# Patient Record
Sex: FEMALE | Race: WHITE | URBAN - METROPOLITAN AREA
[De-identification: names, ages, dates, MRNs, and addresses within clinical notes are randomized per-mention and may not be internally consistent; named-entity substitution may affect disease eponyms.]

---

## 2017-02-09 NOTE — PROGRESS NOTES
"  SUBJECTIVE:                                                    Cristina Garcia is a 61 year old female who presents to clinic today for the following health issues:    Depression and Anxiety Follow-Up    Status since last visit: Improved with medication, doing very well     Other associated symptoms:None    Complicating factors:     Significant life event: No     Current substance abuse: None    PHQ-9 SCORE 1/4/2016 8/17/2016 9/1/2016   Total Score - - -   Total Score 0 2 0     JACKSON-7 SCORE 1/4/2016 8/17/2016 9/1/2016   Total Score - - -   Total Score 0 4 0        PHQ-9  English      PHQ-9   Any Language     GAD7         Amount of exercise or physical activity: 6-7 days/week for an average of 45-60 minutes    Problems taking medications regularly: No    Medication side effects: none    Diet: regular (no restrictions)        Problem list and histories reviewed & adjusted, as indicated.  Additional history: as documented    BP Readings from Last 3 Encounters:   02/21/17 102/54   09/01/16 111/65   08/17/16 100/66    Wt Readings from Last 3 Encounters:   02/21/17 145 lb 4.8 oz (65.9 kg)   09/01/16 137 lb (62.1 kg)   08/17/16 138 lb (62.6 kg)                  Labs reviewed in EPIC    ROS:  C: NEGATIVE for fever, chills, change in weight  INTEGUMENTARY/SKIN: has an itchy mole on her right breast, not as itchy now but on occasion will still itch  E/M: NEGATIVE for ear, mouth and throat problems  R: NEGATIVE for significant cough or SOB  CV: NEGATIVE for chest pain, palpitations or peripheral edema  GI: NEGATIVE for nausea, abdominal pain, heartburn, or change in bowel habits  PSYCHIATRIC: See PHQ 9 and JACKSON 7 questionnaires for symptoms.     OBJECTIVE:                                                    /54 (BP Location: Right arm, Patient Position: Chair, Cuff Size: Adult Regular)  Pulse 64  Temp 97.3  F (36.3  C) (Temporal)  Resp 16  Ht 5' 5\" (1.651 m)  Wt 145 lb 4.8 oz (65.9 kg)  Breastfeeding? No  BMI 24.18 " kg/m2  Body mass index is 24.18 kg/(m^2).  GENERAL: healthy, alert and no distress  SKIN: keratoses - seborrheic # 1, typical in appearance on left breast   PSYCH: mentation appears normal, affect normal/bright    Diagnostic Test Results:  none      ASSESSMENT/PLAN:                                                          1. Anxiety  Doing well, continue current meds  - sertraline (ZOLOFT) 25 MG tablet; Take 1 tablet (25 mg) by mouth daily  Dispense: 90 tablet; Refill: 1    2. Major depression in complete remission (H)  Doing well continue current meds  - sertraline (ZOLOFT) 25 MG tablet; Take 1 tablet (25 mg) by mouth daily  Dispense: 90 tablet; Refill: 1    3. Hyperlipidemia with target LDL less than 130  Await labs  - **Lipid panel reflex to direct LDL FUTURE anytime; Future    4. Screening for diabetes mellitus  Await labs  - Glucose; Future    5. Inflamed seborrheic keratosis  seb kwere all treated in 3 freeze thaw cycles   - DESTRUCT BENIGN LESION, UP TO 14    Recheck in 6 months     Marlin Ann PA-C  [facility]  Electronically signed by Marlin Ann PA-C

## 2017-02-21 ENCOUNTER — OFFICE VISIT (OUTPATIENT)
Dept: FAMILY MEDICINE | Facility: OTHER | Age: 62
End: 2017-02-21
Payer: COMMERCIAL

## 2017-02-21 VITALS
BODY MASS INDEX: 24.21 KG/M2 | DIASTOLIC BLOOD PRESSURE: 54 MMHG | HEIGHT: 65 IN | SYSTOLIC BLOOD PRESSURE: 102 MMHG | RESPIRATION RATE: 16 BRPM | TEMPERATURE: 97.3 F | WEIGHT: 145.3 LBS | HEART RATE: 64 BPM

## 2017-02-21 DIAGNOSIS — Z13.1 SCREENING FOR DIABETES MELLITUS: ICD-10-CM

## 2017-02-21 DIAGNOSIS — E78.5 HYPERLIPIDEMIA WITH TARGET LDL LESS THAN 130: Primary | ICD-10-CM

## 2017-02-21 DIAGNOSIS — F32.5 MAJOR DEPRESSION IN COMPLETE REMISSION (H): ICD-10-CM

## 2017-02-21 DIAGNOSIS — L82.0 INFLAMED SEBORRHEIC KERATOSIS: ICD-10-CM

## 2017-02-21 DIAGNOSIS — F41.9 ANXIETY: ICD-10-CM

## 2017-02-21 PROCEDURE — 99213 OFFICE O/P EST LOW 20 MIN: CPT | Mod: 25 | Performed by: PHYSICIAN ASSISTANT

## 2017-02-21 PROCEDURE — 17110 DESTRUCTION B9 LES UP TO 14: CPT | Performed by: PHYSICIAN ASSISTANT

## 2017-02-21 RX ORDER — SERTRALINE HYDROCHLORIDE 25 MG/1
25 TABLET, FILM COATED ORAL DAILY
Qty: 90 TABLET | Refills: 1 | Status: SHIPPED | OUTPATIENT
Start: 2017-02-21 | End: 2017-08-21

## 2017-02-21 ASSESSMENT — ANXIETY QUESTIONNAIRES
GAD7 TOTAL SCORE: 0
2. NOT BEING ABLE TO STOP OR CONTROL WORRYING: NOT AT ALL
6. BECOMING EASILY ANNOYED OR IRRITABLE: NOT AT ALL
7. FEELING AFRAID AS IF SOMETHING AWFUL MIGHT HAPPEN: NOT AT ALL
1. FEELING NERVOUS, ANXIOUS, OR ON EDGE: NOT AT ALL
3. WORRYING TOO MUCH ABOUT DIFFERENT THINGS: NOT AT ALL
IF YOU CHECKED OFF ANY PROBLEMS ON THIS QUESTIONNAIRE, HOW DIFFICULT HAVE THESE PROBLEMS MADE IT FOR YOU TO DO YOUR WORK, TAKE CARE OF THINGS AT HOME, OR GET ALONG WITH OTHER PEOPLE: NOT DIFFICULT AT ALL
5. BEING SO RESTLESS THAT IT IS HARD TO SIT STILL: NOT AT ALL

## 2017-02-21 ASSESSMENT — PAIN SCALES - GENERAL: PAINLEVEL: NO PAIN (0)

## 2017-02-21 ASSESSMENT — PATIENT HEALTH QUESTIONNAIRE - PHQ9: 5. POOR APPETITE OR OVEREATING: NOT AT ALL

## 2017-02-21 NOTE — MR AVS SNAPSHOT
After Visit Summary   2/21/2017    Cristina Garcia    MRN: [id number]           Patient Information     Date Of Birth          1955        Visit Information        Provider Department      2/21/2017 8:30 AM Marlin Ann PA-C [facility]        Today's Diagnoses     Hyperlipidemia with target LDL less than 130    -  1    Anxiety        Major depression in complete remission (H)        Screening for diabetes mellitus        Inflamed seborrheic keratosis           Follow-ups after your visit        Your next 10 appointments already scheduled     Feb 22, 2017  9:00 AM CST   LAB with NL LAB [facility] ([facility])    [address] [address] [phone]           Patient must bring picture ID.  Patient should be prepared to give a urine specimen  Please do not eat 10-12 hours before your appointment if you are coming in fasting for labs on lipids, cholesterol, or glucose (sugar).  Pregnant women should follow their Care Team instructions. Water with medications is okay. Do not drink coffee or other fluids.   If you have concerns about taking  your medications, please ask at office or if scheduling via [employer], send a message by clicking on Secure Messaging, Message Your Care Team.            Feb 27, 2017 10:30 AM CST   MA SCREENING DIGITAL BILATERAL with PHMA1   [facility] ([facility])    [address] [place] [address] [phone]           Do not use any powder, lotion or deodorant under your arms or on your breast. If you do, we will ask you to remove it before your exam.  Wear comfortable, two-piece clothing.  If you have any allergies, tell your care team.  Bring any previous mammograms from other facilities or have them mailed to the breast center.              Future tests that were ordered for you today     Open Future Orders        Priority Expected Expires Ordered     "**Lipid panel reflex to direct LDL FUTURE anytime Routine 2017    Glucose Routine  2018            Who to contact     If you have questions or need follow up information about today's clinic visit or your schedule please contact [facility] directly at [phone].  Normal or non-critical lab and imaging results will be communicated to you by MyChart, letter or phone within 4 business days after the clinic has received the results. If you do not hear from us within 7 days, please contact the clinic through [employer] or phone. If you have a critical or abnormal lab result, we will notify you by phone as soon as possible.  Submit refill requests through ip.access or call your pharmacy and they will forward the refill request to us. Please allow 3 business days for your refill to be completed.          Additional Information About Your Visit        ip.access Information     [email or web address] lets you send messages to your doctor, view your test results, renew your prescriptions, schedule appointments and more. To sign up, go to [email or web address] . Click on \"Log in\" on the left side of the screen, which will take you to the Welcome page. Then click on \"Sign up Now\" on the right side of the page.     You will be asked to enter the access code listed below, as well as some personal information. Please follow the directions to create your username and password.     Your access code is: [email or web address]  Expires: 2017  8:58 AM     Your access code will  in 90 days. If you need help or a new code, please call your [place] clinic or [phone].        Care EveryWhere ID     This is your Care EveryWhere ID. This could be used by other organizations to access your [place] medical records  [id number]        Your Vitals Were     Pulse Temperature Respirations Height Breastfeeding? BMI (Body Mass Index)    64 97.3  F (36.3  C) (Temporal) 16 5' 5\" (1.651 m) No 24.18 kg/m2 "       Blood Pressure from Last 3 Encounters:   02/21/17 102/54   09/01/16 111/65   08/17/16 100/66    Weight from Last 3 Encounters:   02/21/17 145 lb 4.8 oz (65.9 kg)   09/01/16 137 lb (62.1 kg)   08/17/16 138 lb (62.6 kg)              We Performed the Following     DESTRUCT BENIGN LESION, UP TO 14          Where to get your medicines      These medications were sent to [facility] [place] - [address]   [address] [place] [address]     Phone:  [phone]     sertraline 25 MG tablet          Primary Care Provider Office Phone # Fax #    Marlin Ann PA-C [phone]       [facility] [address] [place] [address]        Thank you!     Thank you for choosing [facility]  for your care. Our goal is always to provide you with excellent care. Hearing back from our patients is one way we can continue to improve our services. Please take a few minutes to complete the written survey that you may receive in the mail after your visit with us. Thank you!             Your Updated Medication List - Protect others around you: Learn how to safely use, store and throw away your medicines at www.disposemymeds.org.          This list is accurate as of: 2/21/17  8:58 AM.  Always use your most recent med list.                   Brand Name Dispense Instructions for use    ASPIRIN ADULT LOW STRENGTH PO      Take 81 mg by mouth       calcium carbonate 500 MG tablet    OS-DONNA 500 mg [place]. Ca     Take 1,000 mg by mouth daily       MULTIVITAMIN PO          sertraline 25 MG tablet    ZOLOFT    90 tablet    Take 1 tablet (25 mg) by mouth daily       ZINC SULFATE PO      Take 100 mg by mouth daily

## 2017-02-21 NOTE — NURSING NOTE
"Chief Complaint   Patient presents with     Depression     Panel Management     lipid, PHQ       Initial /54 (BP Location: Right arm, Patient Position: Chair, Cuff Size: Adult Regular)  Pulse 64  Temp 97.3  F (36.3  C) (Temporal)  Resp 16  Ht 5' 5\" (1.651 m)  Wt 145 lb 4.8 oz (65.9 kg)  Breastfeeding? No  BMI 24.18 kg/m2 Estimated body mass index is 24.18 kg/(m^2) as calculated from the following:    Height as of this encounter: 5' 5\" (1.651 m).    Weight as of this encounter: 145 lb 4.8 oz (65.9 kg).  Medication Reconciliation: complete    "

## 2017-02-22 DIAGNOSIS — Z13.1 SCREENING FOR DIABETES MELLITUS: ICD-10-CM

## 2017-02-22 DIAGNOSIS — E78.5 HYPERLIPIDEMIA WITH TARGET LDL LESS THAN 130: ICD-10-CM

## 2017-02-22 LAB
CHOLEST SERPL-MCNC: 261 MG/DL
GLUCOSE SERPL-MCNC: 97 MG/DL (ref 70–99)
HDLC SERPL-MCNC: 103 MG/DL
LDLC SERPL CALC-MCNC: 148 MG/DL
NONHDLC SERPL-MCNC: 158 MG/DL
TRIGL SERPL-MCNC: 48 MG/DL

## 2017-02-22 PROCEDURE — 80061 LIPID PANEL: CPT | Performed by: PHYSICIAN ASSISTANT

## 2017-02-22 PROCEDURE — 82947 ASSAY GLUCOSE BLOOD QUANT: CPT | Performed by: PHYSICIAN ASSISTANT

## 2017-02-22 PROCEDURE — 36415 COLL VENOUS BLD VENIPUNCTURE: CPT | Performed by: PHYSICIAN ASSISTANT

## 2017-02-22 ASSESSMENT — ANXIETY QUESTIONNAIRES: GAD7 TOTAL SCORE: 0

## 2017-02-22 ASSESSMENT — PATIENT HEALTH QUESTIONNAIRE - PHQ9: SUM OF ALL RESPONSES TO PHQ QUESTIONS 1-9: 0

## 2017-02-27 ENCOUNTER — HOSPITAL ENCOUNTER (OUTPATIENT)
Dept: MAMMOGRAPHY | Facility: CLINIC | Age: 62
Discharge: HOME | End: 2017-02-27
Attending: PHYSICIAN ASSISTANT | Admitting: PHYSICIAN ASSISTANT
Payer: COMMERCIAL

## 2017-02-27 DIAGNOSIS — Z12.31 VISIT FOR SCREENING MAMMOGRAM: ICD-10-CM

## 2017-02-27 PROCEDURE — G0202 SCR MAMMO BI INCL CAD: HCPCS

## 2017-03-06 NOTE — PROGRESS NOTES
"  SUBJECTIVE:                                                    Cristina Garcia is a 61 year old female who presents to clinic today for the following health issues:      She is going out of town in a few days for 1 week. Worries about being sick while gone     Sinus Symptoms      Duration: about 2 weeks    Description  nasal congestion, sore throat, cough, chills, fatigue/malaise and ears \"gurgle\"  Left sided nose/sinus pressure, maybe some PND at night, her throat gets very sore at night.  Cough is mild.    Severity: moderate    Accompanying signs and symptoms: None    History (predisposing factors):  none    Precipitating or alleviating factors: None    Therapies tried and outcome:  Nettipot, hot broth, advil, aleve, benadryl            Problem list and histories reviewed & adjusted, as indicated.  Additional history: as documented    BP Readings from Last 3 Encounters:   03/07/17 112/62   02/21/17 102/54   09/01/16 111/65    Wt Readings from Last 3 Encounters:   03/07/17 146 lb (66.2 kg)   02/21/17 145 lb 4.8 oz (65.9 kg)   09/01/16 137 lb (62.1 kg)                  Labs reviewed in EPIC    Reviewed and updated as needed this visit by clinical staff       Reviewed and updated as needed this visit by Provider         ROS:  As documented above     OBJECTIVE:                                                    /62  Pulse 72  Temp 97.9  F (36.6  C) (Oral)  Resp 12  Ht 5' 5\" (1.651 m)  Wt 146 lb (66.2 kg)  SpO2 97%  BMI 24.3 kg/m2  Body mass index is 24.3 kg/(m^2).  GENERAL: healthy, alert and no distress  EYES: Eyes grossly normal to inspection  HENT: normal cephalic/atraumatic, ear canals and TM's normal, nose and mouth without ulcers or lesions, nasal mucosa edematous , oropharynx clear, oral mucous membranes moist and sinuses: not tender  NECK: no adenopathy, no asymmetry, masses, or scars and thyroid normal to palpation  RESP: lungs clear to auscultation - no rales, rhonchi or wheezes  CV: regular rate and " rhythm, normal S1 S2, no S3 or S4, no murmur, click or rub, no peripheral edema and peripheral pulses strong  MS: no gross musculoskeletal defects noted, no edema    Diagnostic Test Results:  none      ASSESSMENT/PLAN:                                                            1. Acute non-recurrent maxillary sinusitis  otc meds, netti pot, fluids, rest, discussed use of amox or Augmentin pt prefers z pack, it worked best before   - azithromycin (ZITHROMAX) 250 MG tablet; Two tablets first day, then one tablet daily for four days.  Dispense: 6 tablet; Refill: 0        Marlin Ann PA-C  [facility]  Electronically signed by Marlin Ann PA-C

## 2017-03-07 ENCOUNTER — OFFICE VISIT (OUTPATIENT)
Dept: FAMILY MEDICINE | Facility: OTHER | Age: 62
End: 2017-03-07
Payer: COMMERCIAL

## 2017-03-07 VITALS
SYSTOLIC BLOOD PRESSURE: 112 MMHG | BODY MASS INDEX: 24.32 KG/M2 | HEIGHT: 65 IN | RESPIRATION RATE: 12 BRPM | TEMPERATURE: 97.9 F | HEART RATE: 72 BPM | OXYGEN SATURATION: 97 % | WEIGHT: 146 LBS | DIASTOLIC BLOOD PRESSURE: 62 MMHG

## 2017-03-07 DIAGNOSIS — J01.00 ACUTE NON-RECURRENT MAXILLARY SINUSITIS: Primary | ICD-10-CM

## 2017-03-07 PROCEDURE — 99213 OFFICE O/P EST LOW 20 MIN: CPT | Performed by: PHYSICIAN ASSISTANT

## 2017-03-07 RX ORDER — AZITHROMYCIN 250 MG/1
TABLET, FILM COATED ORAL
Qty: 6 TABLET | Refills: 0 | Status: SHIPPED | OUTPATIENT
Start: 2017-03-07 | End: 2017-09-20

## 2017-03-07 ASSESSMENT — PAIN SCALES - GENERAL: PAINLEVEL: NO PAIN (0)

## 2017-03-07 NOTE — NURSING NOTE
"Chief Complaint   Patient presents with     Sinus Problem     Panel Management     mychart        Initial /62  Pulse 72  Temp 97.9  F (36.6  C) (Oral)  Resp 12  Ht 5' 5\" (1.651 m)  Wt 146 lb (66.2 kg)  SpO2 97%  BMI 24.3 kg/m2 Estimated body mass index is 24.3 kg/(m^2) as calculated from the following:    Height as of this encounter: 5' 5\" (1.651 m).    Weight as of this encounter: 146 lb (66.2 kg).  Medication Reconciliation: complete     Lila King CMA (AAMA)      "

## 2017-03-07 NOTE — MR AVS SNAPSHOT
"              After Visit Summary   3/7/2017    Cristina Garcia    MRN: [id number]           Patient Information     Date Of Birth          1955        Visit Information        Provider Department      3/7/2017 9:15 AM Marlin Ann PA-C [facility]        Today's Diagnoses     Acute non-recurrent maxillary sinusitis    -  1       Follow-ups after your visit        Who to contact     If you have questions or need follow up information about today's clinic visit or your schedule please contact [facility] directly at [phone].  Normal or non-critical lab and imaging results will be communicated to you by [employer], letter or phone within 4 business days after the clinic has received the results. If you do not hear from us within 7 days, please contact the clinic through [employer] or phone. If you have a critical or abnormal lab result, we will notify you by phone as soon as possible.  Submit refill requests through [employer] or call your pharmacy and they will forward the refill request to us. Please allow 3 business days for your refill to be completed.          Additional Information About Your Visit        MyChart Information     [employer] lets you send messages to your doctor, view your test results, renew your prescriptions, schedule appointments and more. To sign up, go to [email or web address] . Click on \"Log in\" on the left side of the screen, which will take you to the Welcome page. Then click on \"Sign up Now\" on the right side of the page.     You will be asked to enter the access code listed below, as well as some personal information. Please follow the directions to create your username and password.     Your access code is: [email or web address]  Expires: 2017  8:58 AM     Your access code will  in 90 days. If you need help or a new code, please call your [facility] or [phone].        Care EveryWhere ID     This is your Care EveryWhere ID. This could be used " "by other organizations to access your [place] medical records  [id number]        Your Vitals Were     Pulse Temperature Respirations Height Pulse Oximetry BMI (Body Mass Index)    72 97.9  F (36.6  C) (Oral) 12 5' 5\" (1.651 m) 97% 24.3 kg/m2       Blood Pressure from Last 3 Encounters:   03/07/17 112/62   02/21/17 102/54   09/01/16 111/65    Weight from Last 3 Encounters:   03/07/17 146 lb (66.2 kg)   02/21/17 145 lb 4.8 oz (65.9 kg)   09/01/16 137 lb (62.1 kg)              Today, you had the following     No orders found for display         Today's Medication Changes          These changes are accurate as of: 3/7/17  9:22 AM.  If you have any questions, ask your nurse or doctor.               Start taking these medicines.        Dose/Directions    azithromycin 250 MG tablet   Commonly known as:  ZITHROMAX   Used for:  Acute non-recurrent maxillary sinusitis   Started by:  Marlin Ann PA-C        Two tablets first day, then one tablet daily for four days.   Quantity:  6 tablet   Refills:  0            Where to get your medicines      These medications were sent to [facility] - [place] - [address]   [address] [place] [address]     Phone:  [phone]     azithromycin 250 MG tablet                Primary Care Provider Office Phone # Fax #    Marlin Ann PA-C [phone]       [facility] [address] [place] [address]        Thank you!     Thank you for choosing [facility]  for your care. Our goal is always to provide you with excellent care. Hearing back from our patients is one way we can continue to improve our services. Please take a few minutes to complete the written survey that you may receive in the mail after your visit with us. Thank you!             Your Updated Medication List - Protect others around you: Learn how to safely use, store and throw away your medicines at www.disposemymeds.org.        "   This list is accurate as of: 3/7/17  9:22 AM.  Always use your most recent med list.                   Brand Name Dispense Instructions for use    ASPIRIN ADULT LOW STRENGTH PO      Take 81 mg by mouth       azithromycin 250 MG tablet    ZITHROMAX    6 tablet    Two tablets first day, then one tablet daily for four days.       calcium carbonate 500 MG tablet    OS-DONNA 500 mg [place]. Ca     Take 1,000 mg by mouth daily       MULTIVITAMIN PO          sertraline 25 MG tablet    ZOLOFT    90 tablet    Take 1 tablet (25 mg) by mouth daily       ZINC SULFATE PO      Take 100 mg by mouth daily

## 2017-08-21 DIAGNOSIS — F41.9 ANXIETY: ICD-10-CM

## 2017-08-21 DIAGNOSIS — F32.5 MAJOR DEPRESSION IN COMPLETE REMISSION (H): ICD-10-CM

## 2017-08-22 RX ORDER — SERTRALINE HYDROCHLORIDE 25 MG/1
TABLET, FILM COATED ORAL
Qty: 30 TABLET | Refills: 0 | Status: SHIPPED | OUTPATIENT
Start: 2017-08-22 | End: 2017-09-20

## 2017-08-22 NOTE — TELEPHONE ENCOUNTER
Medication is being filled for 1 time refill only due to:  Patient needs to be seen because due for a mood follow up.  Last mood follow up was on 2/21/2017.    Brittany López RN

## 2017-08-22 NOTE — TELEPHONE ENCOUNTER
sertraline (ZOLOFT) 25 MG tablet     Last Written Prescription Date: 2/21/17  Last Fill Quantity: 90, # refills: 1  Last Office Visit with G primary care provider:  3/7/17        Last PHQ-9 score on record=   PHQ-9 SCORE 2/21/2017   Total Score -   Total Score 0

## 2017-08-31 ENCOUNTER — MYC MEDICAL ADVICE (OUTPATIENT)
Dept: FAMILY MEDICINE | Facility: OTHER | Age: 62
End: 2017-08-31

## 2017-08-31 DIAGNOSIS — E78.00 HYPERCHOLESTEREMIA: Primary | ICD-10-CM

## 2017-09-01 DIAGNOSIS — E78.00 HYPERCHOLESTEREMIA: ICD-10-CM

## 2017-09-01 LAB
CHOLEST SERPL-MCNC: 260 MG/DL
HDLC SERPL-MCNC: 104 MG/DL
LDLC SERPL CALC-MCNC: 137 MG/DL
NONHDLC SERPL-MCNC: 156 MG/DL
TRIGL SERPL-MCNC: 96 MG/DL

## 2017-09-01 PROCEDURE — 80061 LIPID PANEL: CPT | Performed by: PHYSICIAN ASSISTANT

## 2017-09-01 PROCEDURE — 36415 COLL VENOUS BLD VENIPUNCTURE: CPT | Performed by: PHYSICIAN ASSISTANT

## 2017-09-11 ENCOUNTER — TELEPHONE (OUTPATIENT)
Dept: FAMILY MEDICINE | Facility: OTHER | Age: 62
End: 2017-09-11

## 2017-09-11 NOTE — TELEPHONE ENCOUNTER
Summary:    Patient is due/failing the following:   FIT test   Action needed:   Complete a FIT test     Type of outreach:    Phone, spoke to patient.  patient will discuss at her OV.    Questions for provider review:    None                                                                                                                                    Jovita Macias     Chart routed to Care Team .        Panel Management Review      Patient has the following on her problem list:     Depression / Dysthymia review  PHQ-9 SCORE 8/17/2016 9/1/2016 2/21/2017   Total Score - - -   Total Score 2 0 0      Patient is due for:  PHQ9 and DAP        Composite cancer screening  Chart review shows that this patient is due/due soon for the following Fecal Colorectal (FIT)

## 2017-09-14 NOTE — PROGRESS NOTES
"  SUBJECTIVE:                                                    Cristina Garcia is a 62 year old female who presents to clinic today for the following health issues:      History of Present Illness   Depression & Anxiety Follow-up:     Depression/Anxiety:  Anxiety only    Status since last visit::  Stable (she continues to do very well. No side effects or issues with this med)    Other associated symptoms of anxiety::  None    Significant life event::  No    Current substance use::  None    Depression symptoms::  None  Diet::  Regular (no restrictions)  Frequency of exercise::  6-7 days/week  Duration of exercise::  15-30 minutes  Taking medications regularly::  Yes  Medication side effects::  None  Additional concerns today::  YES (allergy/cold)      She has had 10 days of nonproductive cough, headaches with cough and fatigue. No fevers or chills. No facial pain or pressure shortness of breath or sore throat. She does have nasal congestion. She sleeps okay at night due to NyQuil.  Er grandson has been ill and spreads into the rest of them    Problem list and histories reviewed & adjusted, as indicated.  Additional history: as documented        BP Readings from Last 3 Encounters:   09/20/17 110/70   03/07/17 112/62   02/21/17 102/54    Wt Readings from Last 3 Encounters:   09/20/17 141 lb 14.4 oz (64.4 kg)   03/07/17 146 lb (66.2 kg)   02/21/17 145 lb 4.8 oz (65.9 kg)                  Labs reviewed in [facility]      ROS:  As above    OBJECTIVE:     /70 (BP Location: Left arm, Patient Position: Sitting, Cuff Size: Adult Regular)  Pulse 88  Temp 98.7  F (37.1  C) (Oral)  Resp 16  Ht 5' 4.57\" (1.64 m)  Wt 141 lb 14.4 oz (64.4 kg)  Breastfeeding? No  BMI 23.93 kg/m2  Body mass index is 23.93 kg/(m^2).  GENERAL: healthy, alert and no distress  HENT: ear canals and TM's normal, nose and mouth without ulcers or lesions  NECK: no adenopathy, no asymmetry, masses, or scars and thyroid normal to palpation  RESP: lungs " clear to auscultation - no rales, rhonchi or wheezes  CV: regular rate and rhythm, normal S1 S2, no S3 or S4, no murmur, click or rub, no peripheral edema and peripheral pulses strong  ABDOMEN: soft, nontender, no hepatosplenomegaly, no masses and bowel sounds normal  MS: no gross musculoskeletal defects noted, no edema  PSYCH: mentation appears normal, affect normal/bright    Diagnostic Test Results:  none     ASSESSMENT/PLAN:         1. Anxiety  Doing well, continue current meds  - sertraline (ZOLOFT) 25 MG tablet; Take 1 tablet (25 mg) by mouth daily  Dispense: 90 tablet; Refill: 3    2. Major depression in complete remission (H)  Doing well continue current meds recheck 6 months phone visit 1 year office visit  - sertraline (ZOLOFT) 25 MG tablet; Take 1 tablet (25 mg) by mouth daily  Dispense: 90 tablet; Refill: 3    3. Special screening for malignant neoplasms, colon  Card given  - Fecal colorectal cancer screen FIT; Future    4. Viral URI with cough  For now she will treat over-the-counter, she will use Mucinex, she will start Zithromax if symptoms worsening over the next 3-5 days.  - azithromycin (ZITHROMAX) 250 MG tablet; Two tablets first day, then one tablet daily for four days.  Dispense: 6 tablet; Refill: 0    This chart documentation was completed in part with Dragon voice recognition software.  Documentation is reviewed after completion, however, some words and grammatical errors may remain.  POP Elmore PA-C  [facility]  Electronically signed by Marlin Ann PA-C

## 2017-09-20 ENCOUNTER — OFFICE VISIT (OUTPATIENT)
Dept: FAMILY MEDICINE | Facility: OTHER | Age: 62
End: 2017-09-20
Payer: COMMERCIAL

## 2017-09-20 VITALS
RESPIRATION RATE: 16 BRPM | DIASTOLIC BLOOD PRESSURE: 70 MMHG | HEIGHT: 65 IN | SYSTOLIC BLOOD PRESSURE: 110 MMHG | HEART RATE: 88 BPM | TEMPERATURE: 98.7 F | WEIGHT: 141.9 LBS | BODY MASS INDEX: 23.64 KG/M2

## 2017-09-20 DIAGNOSIS — F32.5 MAJOR DEPRESSION IN COMPLETE REMISSION (H): ICD-10-CM

## 2017-09-20 DIAGNOSIS — Z12.11 SPECIAL SCREENING FOR MALIGNANT NEOPLASMS, COLON: Primary | ICD-10-CM

## 2017-09-20 DIAGNOSIS — J06.9 VIRAL URI WITH COUGH: ICD-10-CM

## 2017-09-20 DIAGNOSIS — F41.9 ANXIETY: ICD-10-CM

## 2017-09-20 PROCEDURE — 99214 OFFICE O/P EST MOD 30 MIN: CPT | Performed by: PHYSICIAN ASSISTANT

## 2017-09-20 RX ORDER — SERTRALINE HYDROCHLORIDE 25 MG/1
25 TABLET, FILM COATED ORAL DAILY
Qty: 90 TABLET | Refills: 3 | Status: SHIPPED | OUTPATIENT
Start: 2017-09-20 | End: 2018-07-25 | Stop reason: DRUGHIGH

## 2017-09-20 RX ORDER — AZITHROMYCIN 250 MG/1
TABLET, FILM COATED ORAL
Qty: 6 TABLET | Refills: 0 | Status: SHIPPED | OUTPATIENT
Start: 2017-09-20 | End: 2018-07-25

## 2017-09-20 ASSESSMENT — ANXIETY QUESTIONNAIRES
6. BECOMING EASILY ANNOYED OR IRRITABLE: NOT AT ALL
7. FEELING AFRAID AS IF SOMETHING AWFUL MIGHT HAPPEN: NOT AT ALL
GAD7 TOTAL SCORE: 1
7. FEELING AFRAID AS IF SOMETHING AWFUL MIGHT HAPPEN: NOT AT ALL
GAD7 TOTAL SCORE: 1
1. FEELING NERVOUS, ANXIOUS, OR ON EDGE: SEVERAL DAYS
GAD7 TOTAL SCORE: 1
3. WORRYING TOO MUCH ABOUT DIFFERENT THINGS: NOT AT ALL
2. NOT BEING ABLE TO STOP OR CONTROL WORRYING: NOT AT ALL
5. BEING SO RESTLESS THAT IT IS HARD TO SIT STILL: NOT AT ALL
4. TROUBLE RELAXING: NOT AT ALL

## 2017-09-20 ASSESSMENT — PATIENT HEALTH QUESTIONNAIRE - PHQ9
10. IF YOU CHECKED OFF ANY PROBLEMS, HOW DIFFICULT HAVE THESE PROBLEMS MADE IT FOR YOU TO DO YOUR WORK, TAKE CARE OF THINGS AT HOME, OR GET ALONG WITH OTHER PEOPLE: NOT DIFFICULT AT ALL
SUM OF ALL RESPONSES TO PHQ QUESTIONS 1-9: 2
SUM OF ALL RESPONSES TO PHQ QUESTIONS 1-9: 2

## 2017-09-20 ASSESSMENT — PAIN SCALES - GENERAL: PAINLEVEL: NO PAIN (0)

## 2017-09-20 NOTE — LETTER
My Depression Action Plan  Name: Cristina Garcia   Date of Birth 1955  Date: 9/14/2017    My doctor: Marlin Ann   My clinic: [address] [address] [phone]          GREEN    ZONE   Good Control    What it looks like:     Things are going generally well. You have normal up s and down s. You may even feel depressed from time to time, but bad moods usually last less than a day.   What you need to do:  1. Continue to care for yourself (see self care plan)  2. Check your depression survival kit and update it as needed  3. Follow your physician s recommendations including any medication.  4. Do not stop taking medication unless you consult with your physician first.           YELLOW         ZONE Getting Worse    What it looks like:     Depression is starting to interfere with your life.     It may be hard to get out of bed; you may be starting to isolate yourself from others.    Symptoms of depression are starting to last most all day and this has happened for several days.     You may have suicidal thoughts but they are not constant.   What you need to do:     1. Call your care team, your response to treatment will improve if you keep your care team informed of your progress. Yellow periods are signs an adjustment may need to be made.     2. Continue your self-care, even if you have to fake it!    3. Talk to someone in your support network    4. Open up your depression survival kit           RED    ZONE Medical Alert - Get Help    What it looks like:     Depression is seriously interfering with your life.     You may experience these or other symptoms: You can t get out of bed most days, can t work or engage in other necessary activities, you have trouble taking care of basic hygiene, or basic responsibilities, thoughts of suicide or death that will not go away, self-injurious behavior.     What you need to do:  1. Call your care team and  request a same-day appointment. If they are not available (weekends or after hours) call your local crisis line, emergency room or 911.      Electronically signed by: Johanna Méndez, September 14, 2017    Depression Self Care Plan / Survival Kit    Self-Care for Depression  Here s the deal. Your body and mind are really not as separate as most people think.  What you do and think affects how you feel and how you feel influences what you do and think. This means if you do things that people who feel good do, it will help you feel better.  Sometimes this is all it takes.  There is also a place for medication and therapy depending on how severe your depression is, so be sure to consult with your medical provider and/ or Behavioral Health Consultant if your symptoms are worsening or not improving.     In order to better manage my stress, I will:    Exercise  Get some form of exercise, every day. This will help reduce pain and release endorphins, the  feel good  chemicals in your brain. This is almost as good as taking antidepressants!  This is not the same as joining a gym and then never going! (they count on that by the way ) It can be as simple as just going for a walk or doing some gardening, anything that will get you moving.      Hygiene   Maintain good hygiene (Get out of bed in the morning, Make your bed, Brush your teeth, Take a shower, and Get dressed like you were going to work, even if you are unemployed).  If your clothes don't fit try to get ones that do.    Diet  I will strive to eat foods that are good for me, drink plenty of water, and avoid excessive sugar, caffeine, alcohol, and other mood-altering substances.  Some foods that are helpful in depression are: complex carbohydrates, B vitamins, flaxseed, fish or fish oil, fresh fruits and vegetables.    Psychotherapy  I agree to participate in Individual Therapy (if recommended).    Medication  If prescribed medications, I agree to take them.   Missing doses can result in serious side effects.  I understand that drinking alcohol, or other illicit drug use, may cause potential side effects.  I will not stop my medication abruptly without first discussing it with my provider.    Staying Connected With Others  I will stay in touch with my friends, family members, and my primary care provider/team.    Use your imagination  Be creative.  We all have a creative side; it doesn t matter if it s oil painting, sand castles, or mud pies! This will also kick up the endorphins.    Witness Beauty  (AKA stop and smell the roses) Take a look outside, even in mid-winter. Notice colors, textures. Watch the squirrels and birds.     Service to others  Be of service to others.  There is always someone else in need.  By helping others we can  get out of ourselves  and remember the really important things.  This also provides opportunities for practicing all the other parts of the program.    Humor  Laugh and be silly!  Adjust your TV habits for less news and crime-drama and more comedy.    Control your stress  Try breathing deep, massage therapy, biofeedback, and meditation. Find time to relax each day.     My support system    Clinic Contact:  Phone number:    Contact 1:  Phone number:    Contact 2:  Phone number:    Church/:  Phone number:    Therapist:  Phone number:    Local crisis center:    Phone number:    Other community support:  Phone number:

## 2017-09-20 NOTE — MR AVS SNAPSHOT
After Visit Summary   9/20/2017    Cristina Garcia    MRN: [id number]           Patient Information     Date Of Birth          1955        Visit Information        Provider Department      9/20/2017 11:30 AM Marlin Ann PA-C [facility]        Today's Diagnoses     Special screening for malignant neoplasms, colon    -  1    Anxiety        Major depression in complete remission (H)        Viral URI with cough           Follow-ups after your visit        Future tests that were ordered for you today     Open Future Orders        Priority Expected Expires Ordered    Fecal colorectal cancer screen FIT Routine 10/5/2017 12/7/2017 9/20/2017            Who to contact     If you have questions or need follow up information about today's clinic visit or your schedule please contact [facility] directly at [phone].  Normal or non-critical lab and imaging results will be communicated to you by MyChart, letter or phone within 4 business days after the clinic has received the results. If you do not hear from us within 7 days, please contact the clinic through MyChart or phone. If you have a critical or abnormal lab result, we will notify you by phone as soon as possible.  Submit refill requests through Sensum or call your pharmacy and they will forward the refill request to us. Please allow 3 business days for your refill to be completed.          Additional Information About Your Visit        MyChart Information     Sensum gives you secure access to your electronic health record. If you see a primary care provider, you can also send messages to your care team and make appointments. If you have questions, please call your primary care clinic.  If you do not have a primary care provider, please call [phone] and they will assist you.        Care EveryWhere ID     This is your Care EveryWhere ID. This could be used by other organizations to access your [facility]  "records  [id number]        Your Vitals Were     Pulse Temperature Respirations Height Breastfeeding? BMI (Body Mass Index)    88 98.7  F (37.1  C) (Oral) 16 5' 4.57\" (1.64 m) No 23.93 kg/m2       Blood Pressure from Last 3 Encounters:   09/20/17 110/70   03/07/17 112/62   02/21/17 102/54    Weight from Last 3 Encounters:   09/20/17 141 lb 14.4 oz (64.4 kg)   03/07/17 146 lb (66.2 kg)   02/21/17 145 lb 4.8 oz (65.9 kg)              We Performed the Following     DEPRESSION ACTION PLAN (DAP)          Today's Medication Changes          These changes are accurate as of: 9/20/17 12:02 PM.  If you have any questions, ask your nurse or doctor.               Start taking these medicines.        Dose/Directions    azithromycin 250 MG tablet   Commonly known as:  ZITHROMAX   Used for:  Viral URI with cough   Started by:  Marlin Ann PA-C        Two tablets first day, then one tablet daily for four days.   Quantity:  6 tablet   Refills:  0         These medicines have changed or have updated prescriptions.        Dose/Directions    sertraline 25 MG tablet   Commonly known as:  ZOLOFT   This may have changed:  See the new instructions.   Used for:  Anxiety, Major depression in complete remission (H)   Changed by:  Marlin Ann PA-C        Dose:  25 mg   Take 1 tablet (25 mg) by mouth daily   Quantity:  90 tablet   Refills:  3            Where to get your medicines      These medications were sent to [facility] - [address]  [address] [address]     Phone:  [phone]     sertraline 25 MG tablet         Some of these will need a paper prescription and others can be bought over the counter.  Ask your nurse if you have questions.     Bring a paper prescription for each of these medications     azithromycin 250 MG tablet                Primary Care Provider Office Phone # Fax #    Marlin Ann PA-C [phone] [address] [place] [address]        Equal Access to Services     [facility]: Hadii jacob landry lesleyjolie Hayali, waamolda luqaranzaha, qashivta grzegorzmarilyndmitriy bae. So [facility] [phone].    ATENCIÓN: Si habla español, tiene a frazier disposición servicios gratuitos de asistencia lingüística. Llame al [phone].    We comply with applicable federal civil rights laws and [place] laws. We do not discriminate on the basis of race, color, national origin, age, disability sex, sexual orientation or gender identity.            Thank you!     Thank you for choosing [facility]  for your care. Our goal is always to provide you with excellent care. Hearing back from our patients is one way we can continue to improve our services. Please take a few minutes to complete the written survey that you may receive in the mail after your visit with us. Thank you!             Your Updated Medication List - Protect others around you: Learn how to safely use, store and throw away your medicines at www.disposemymeds.org.          This list is accurate as of: 9/20/17 12:02 PM.  Always use your most recent med list.                   Brand Name Dispense Instructions for use Diagnosis    ASPIRIN ADULT LOW STRENGTH PO      Take 81 mg by mouth        azithromycin 250 MG tablet    ZITHROMAX    6 tablet    Two tablets first day, then one tablet daily for four days.    Viral URI with cough       calcium carbonate 1250 MG tablet    OS-DONNA 500 mg [place]. Ca     Take 1,000 mg by mouth daily        MULTIVITAMIN PO           sertraline 25 MG tablet    ZOLOFT    90 tablet    Take 1 tablet (25 mg) by mouth daily    Anxiety, Major depression in complete remission (H)       ZINC SULFATE PO      Take 100 mg by mouth daily

## 2017-09-20 NOTE — NURSING NOTE
"Chief Complaint   Patient presents with     Recheck Medication     Panel Management     Flu shot, FIT test, dap, phq, melida       Initial /70 (BP Location: Left arm, Patient Position: Sitting, Cuff Size: Adult Regular)  Pulse 88  Temp 98.7  F (37.1  C) (Oral)  Resp 16  Ht 5' 4.57\" (1.64 m)  Wt 141 lb 14.4 oz (64.4 kg)  Breastfeeding? No  BMI 23.93 kg/m2 Estimated body mass index is 23.93 kg/(m^2) as calculated from the following:    Height as of this encounter: 5' 4.57\" (1.64 m).    Weight as of this encounter: 141 lb 14.4 oz (64.4 kg).  Medication Reconciliation: complete  Willard Montez    "

## 2017-09-21 ASSESSMENT — ANXIETY QUESTIONNAIRES: GAD7 TOTAL SCORE: 1

## 2017-09-22 PROCEDURE — 82274 ASSAY TEST FOR BLOOD FECAL: CPT | Performed by: PHYSICIAN ASSISTANT

## 2017-09-28 DIAGNOSIS — Z12.11 SPECIAL SCREENING FOR MALIGNANT NEOPLASMS, COLON: ICD-10-CM

## 2017-09-28 LAB — HEMOCCULT STL QL IA: NEGATIVE

## 2018-03-07 ENCOUNTER — HOSPITAL ENCOUNTER (OUTPATIENT)
Dept: MAMMOGRAPHY | Facility: CLINIC | Age: 63
Discharge: HOME | End: 2018-03-07
Attending: PHYSICIAN ASSISTANT | Admitting: PHYSICIAN ASSISTANT
Payer: COMMERCIAL

## 2018-03-07 DIAGNOSIS — Z12.31 VISIT FOR SCREENING MAMMOGRAM: ICD-10-CM

## 2018-03-07 PROCEDURE — 77067 SCR MAMMO BI INCL CAD: CPT

## 2018-07-20 NOTE — PROGRESS NOTES
SUBJECTIVE:   Cristina Garcia is a 63 year old female who presents to clinic today for the following health issues:  The ASCVD Risk score (Mila DC Jr, et al., 2013) failed to calculate for the following reasons:    The valid HDL cholesterol range is 20 to 100 mg/dL  Patient is eligible for use of low-dose aspirin for primary prevention of heart attack and stroke.  Provider has discussed aspirin with patient and our decision was:     Prescribe:  Daily low-dose aspirin recommended for primary prevention, patient agrees with plan.          History of Present Illness     Depression & Anxiety Follow-up:     Depression/Anxiety:  Anxiety only    Status since last visit::  Worsened    Other associated symptoms of anxiety::  YES    Significant life event::  YES    Current substance use::  None    Depression symptoms::  None       Today's PHQ-9         PHQ-9 Total Score:     (P) 3   PHQ-9 Q9 Suicidal ideation:   (P) Not at all   Thoughts of suicide or self harm:      Self-harm Plan:        Self-harm Action:          Safety concerns for self or others:          Frequency of exercise:  4-5 days/week  Duration of exercise:  30-45 minutes  Taking medications regularly:  Yes  Additional concerns today:  YES    COGNITIVE SCREEN  1) Repeat 3 items (Leader, Season, Table)    2) Clock draw: ABNORMAL not all numbers listed but correct time, she only put in the 11,12, 1, and 2  3) 3 item recall: Recalls 1 object   Results: ABNORMAL clock, 1-2 items recalled: PROBABLE COGNITIVE IMPAIRMENT, **INFORM PROVIDER**    Mini-CogTM Copyright YVONNE Patel. Licensed by the author for use in [facility]; reprinted with permission (tam@.[facility]). All rights reserved.      Problem list and histories reviewed & adjusted, as indicated.  Additional history: Patient reports they will be moving to care for her mother-in-law to [place] 6 hours away at the end of September.  This is very difficult for her because she is having to leave her  "children and grandchild behind.  Previously when I saw her in September, she was doing well on her 25 mg of sertraline.  She has not had any side effects or issues with taking it.  She states changes hard and she is very emotional about leaving her children and grandchild behind.    Another concern is that she has been more forgetful.  She states she has been more forgetful for the past several years and it is getting worse.  Her  is very aggravated by it.  He will tell her something and 20 minutes later she will not remember what he said.  She states 1 of her sisters is also forgetful.  She feels that she is sleeping well and does not feel that this is contributing to her forgetfulness.  She has been unable to fall back asleep due to her recent stressors but states typically this is not an issue.  She is very worried there is something wrong.        BP Readings from Last 3 Encounters:   07/25/18 110/62   09/20/17 110/70   03/07/17 112/62    Wt Readings from Last 3 Encounters:   07/25/18 142 lb (64.4 kg)   09/20/17 141 lb 14.4 oz (64.4 kg)   03/07/17 146 lb (66.2 kg)                  Labs reviewed in EPIC    ROS:      OBJECTIVE:     /62  Pulse 78  Temp 98.9  F (37.2  C) (Temporal)  Resp 12  Ht 5' 4.5\" (1.638 m)  Wt 142 lb (64.4 kg)  BMI 24 kg/m2  Body mass index is 24 kg/(m^2).  GENERAL: healthy, alert and no distress  PSYCH: mentation appears normal, affect normal for the most part though does become tearful when speaking of her family  PSYCH: tearful, anxious, judgement and insight intact and appearance well groomed    Diagnostic Test Results:  none, discussed doing vitamin B12 D and TSH the patient did not seem very interested today, so it was not done    ASSESSMENT/PLAN:             1. Major depression in complete remission (H)  Increase dosage of sertraline to 50 mg, recheck 1 month phone visit or office visit  - sertraline (ZOLOFT) 50 MG tablet; Take 1 tablet (50 mg) by mouth daily  " Dispense: 30 tablet; Refill: 1    2. Anxiety  Increased dosage  - sertraline (ZOLOFT) 50 MG tablet; Take 1 tablet (50 mg) by mouth daily  Dispense: 30 tablet; Refill: 1    3. Memory difficulty  Patient would like some more testing, we will start with referral to neurology  - NEUROLOGY ADULT REFERRAL    This chart documentation was completed in part with Dragon voice recognition software.  Documentation is reviewed after completion, however, some words and grammatical errors may remain.  POP Elmore PA-C  [facility]  Electronically signed by Marlin Ann PA-C   Answers for HPI/ROS submitted by the patient on 7/25/2018   PHQ-2 Score: 1  If you checked off any problems, how difficult have these problems made it for you to do your work, take care of things at home, or get along with other people?: Somewhat difficult  PHQ9 TOTAL SCORE: 3  Electronically signed by Marlin Ann PA-C

## 2018-07-25 ENCOUNTER — OFFICE VISIT (OUTPATIENT)
Dept: FAMILY MEDICINE | Facility: OTHER | Age: 63
End: 2018-07-25
Payer: COMMERCIAL

## 2018-07-25 VITALS
DIASTOLIC BLOOD PRESSURE: 62 MMHG | HEART RATE: 78 BPM | RESPIRATION RATE: 12 BRPM | TEMPERATURE: 98.9 F | BODY MASS INDEX: 23.66 KG/M2 | HEIGHT: 65 IN | SYSTOLIC BLOOD PRESSURE: 110 MMHG | WEIGHT: 142 LBS

## 2018-07-25 DIAGNOSIS — F41.9 ANXIETY: ICD-10-CM

## 2018-07-25 DIAGNOSIS — R41.3 MEMORY DIFFICULTY: ICD-10-CM

## 2018-07-25 DIAGNOSIS — F32.5 MAJOR DEPRESSION IN COMPLETE REMISSION (H): Primary | ICD-10-CM

## 2018-07-25 PROCEDURE — 99214 OFFICE O/P EST MOD 30 MIN: CPT | Performed by: PHYSICIAN ASSISTANT

## 2018-07-25 ASSESSMENT — PATIENT HEALTH QUESTIONNAIRE - PHQ9
SUM OF ALL RESPONSES TO PHQ QUESTIONS 1-9: 3
10. IF YOU CHECKED OFF ANY PROBLEMS, HOW DIFFICULT HAVE THESE PROBLEMS MADE IT FOR YOU TO DO YOUR WORK, TAKE CARE OF THINGS AT HOME, OR GET ALONG WITH OTHER PEOPLE: SOMEWHAT DIFFICULT
SUM OF ALL RESPONSES TO PHQ QUESTIONS 1-9: 3

## 2018-07-25 ASSESSMENT — ANXIETY QUESTIONNAIRES
GAD7 TOTAL SCORE: 13
7. FEELING AFRAID AS IF SOMETHING AWFUL MIGHT HAPPEN: MORE THAN HALF THE DAYS

## 2018-07-25 ASSESSMENT — PAIN SCALES - GENERAL: PAINLEVEL: NO PAIN (0)

## 2018-07-25 NOTE — MR AVS SNAPSHOT
After Visit Summary   7/25/2018    Cristina Garcia    MRN: [id number]           Patient Information     Date Of Birth          1955        Visit Information        Provider Department      7/25/2018 9:45 AM Marlin Ann PA-C [facility]        Today's Diagnoses     Major depression in complete remission (H)    -  1    Anxiety        Memory difficulty           Follow-ups after your visit        Additional Services     NEUROLOGY ADULT REFERRAL       Your provider has referred you for the following:   Consult at [facility]: [facility] Neurology [facility] [phone]   [email or web address]    Please be aware that coverage of these services is subject to the terms and limitations of your health insurance plan.  Call member services at your health plan with any benefit or coverage questions.      Please bring the following with you to your appointment:    (1) Any X-Rays, CTs or MRIs which have been performed.  Contact the facility where they were done to arrange for  prior to your scheduled appointment.    (2) List of current medications  (3) This referral request   (4) Any documents/labs given to you for this referral                  Who to contact     If you have questions or need follow up information about today's clinic visit or your schedule please contact [facility] directly at [phone].  Normal or non-critical lab and imaging results will be communicated to you by MyChart, letter or phone within 4 business days after the clinic has received the results. If you do not hear from us within 7 days, please contact the clinic through MyChart or phone. If you have a critical or abnormal lab result, we will notify you by phone as soon as possible.  Submit refill requests through Kirax or call your pharmacy and they will forward the refill request to us. Please allow 3 business days for your  "refill to be completed.          Additional Information About Your Visit        [employer] Information     [employer] gives you secure access to your electronic health record. If you see a primary care provider, you can also send messages to your care team and make appointments. If you have questions, please call your primary care clinic.  If you do not have a primary care provider, please call [phone] and they will assist you.        Care EveryWhere ID     This is your Care EveryWhere ID. This could be used by other organizations to access your [facility] medical records  [id number]        Your Vitals Were     Pulse Temperature Respirations Height BMI (Body Mass Index)       78 98.9  F (37.2  C) (Temporal) 12 5' 4.5\" (1.638 m) 24 kg/m2        Blood Pressure from Last 3 Encounters:   07/25/18 110/62   09/20/17 110/70   03/07/17 112/62    Weight from Last 3 Encounters:   07/25/18 142 lb (64.4 kg)   09/20/17 141 lb 14.4 oz (64.4 kg)   03/07/17 146 lb (66.2 kg)              We Performed the Following     NEUROLOGY ADULT REFERRAL          Today's Medication Changes          These changes are accurate as of 7/25/18 10:13 AM.  If you have any questions, ask your nurse or doctor.               These medicines have changed or have updated prescriptions.        Dose/Directions    sertraline 50 MG tablet   Commonly known as:  ZOLOFT   This may have changed:    - medication strength  - how much to take   Used for:  Major depression in complete remission (H), Anxiety   Changed by:  Marlin Ann PA-C        Dose:  50 mg   Take 1 tablet (50 mg) by mouth daily   Quantity:  30 tablet   Refills:  1            Where to get your medicines      These medications were sent to [facility] - [place] [place] - [address]  [address] [address]     Phone:  [phone]     sertraline 50 MG tablet                Primary Care Provider Office Phone # Fax #    Marlin Ann PA-C [phone] " [phone]       [address] [place] [address]        Equal Access to Services     ABELARDOGILLIAN LEIU : Hadii aad ku hadlibbyjolie Martell, waamolda mortezaadaha, qaybta grzegorzmarilynmc cormier, dmitriy burksberealma vaughan. So [facility] [phone].    ATENCIÓN: Si habla español, tiene a frazier disposición servicios gratuitos de asistencia lingüística. Llame al [phone].    We comply with applicable federal civil rights laws and [place] laws. We do not discriminate on the basis of race, color, national origin, age, disability, sex, sexual orientation, or gender identity.            Thank you!     Thank you for choosing [facility] [place]  for your care. Our goal is always to provide you with excellent care. Hearing back from our patients is one way we can continue to improve our services. Please take a few minutes to complete the written survey that you may receive in the mail after your visit with us. Thank you!             Your Updated Medication List - Protect others around you: Learn how to safely use, store and throw away your medicines at www.disposemymeds.org.          This list is accurate as of 7/25/18 10:13 AM.  Always use your most recent med list.                   Brand Name Dispense Instructions for use Diagnosis    ASPIRIN ADULT LOW STRENGTH PO      Take 81 mg by mouth        calcium carbonate 500 MG tablet    OS-DONNA 500 mg [place]. Ca     Take 1,000 mg by mouth daily        MULTIVITAMIN PO           sertraline 50 MG tablet    ZOLOFT    30 tablet    Take 1 tablet (50 mg) by mouth daily    Major depression in complete remission (H), Anxiety       ZINC SULFATE PO      Take 100 mg by mouth daily

## 2018-07-26 ASSESSMENT — PATIENT HEALTH QUESTIONNAIRE - PHQ9: SUM OF ALL RESPONSES TO PHQ QUESTIONS 1-9: 3

## 2018-08-17 ENCOUNTER — TRANSFERRED RECORDS (OUTPATIENT)
Dept: HEALTH INFORMATION MANAGEMENT | Facility: CLINIC | Age: 63
End: 2018-08-17

## 2018-08-18 ENCOUNTER — HOSPITAL ENCOUNTER (OUTPATIENT)
Dept: MRI IMAGING | Facility: CLINIC | Age: 63
Discharge: HOME | End: 2018-08-18
Attending: PSYCHIATRY & NEUROLOGY | Admitting: PSYCHIATRY & NEUROLOGY
Payer: COMMERCIAL

## 2018-08-18 DIAGNOSIS — R41.9 COGNITIVE COMPLAINTS: ICD-10-CM

## 2018-08-18 PROCEDURE — 70553 MRI BRAIN STEM W/O & W/DYE: CPT

## 2018-08-18 PROCEDURE — A9585 GADOBUTROL INJECTION: HCPCS | Performed by: RADIOLOGY

## 2018-08-18 PROCEDURE — 25000128 H RX IP 250 OP 636: Performed by: RADIOLOGY

## 2018-08-18 RX ORDER — GADOBUTROL 604.72 MG/ML
7.5 INJECTION INTRAVENOUS ONCE
Status: COMPLETED | OUTPATIENT
Start: 2018-08-18 | End: 2018-08-18

## 2018-08-18 RX ADMIN — GADOBUTROL 6.5 ML: 604.72 INJECTION INTRAVENOUS at 09:52

## 2018-09-05 DIAGNOSIS — F32.5 MAJOR DEPRESSION IN COMPLETE REMISSION (H): ICD-10-CM

## 2018-09-05 DIAGNOSIS — F41.9 ANXIETY: ICD-10-CM

## 2018-09-06 NOTE — TELEPHONE ENCOUNTER
Zoloft:  Sent 7/25/18 with 2 supply. She is due for a follow up phone or OV regarding this medication. Please help schedule prior to running out of medication.     Celena Plaza, RN, BSN

## 2018-09-06 NOTE — TELEPHONE ENCOUNTER
Spoke with patient appointment scheduled     Next 5 appointments (look out 90 days)     Sep 11, 2018  1:45 PM CDT   Telephone Visit with Marlin Ann PA-C   [facility] ([facility])    [address] [address] [phone]                Closing encounter  Coreen Toscano RT (R)

## 2018-09-11 ENCOUNTER — VIRTUAL VISIT (OUTPATIENT)
Dept: FAMILY MEDICINE | Facility: OTHER | Age: 63
End: 2018-09-11

## 2018-09-11 DIAGNOSIS — F32.5 MAJOR DEPRESSION IN COMPLETE REMISSION (H): ICD-10-CM

## 2018-09-11 DIAGNOSIS — F41.9 ANXIETY: ICD-10-CM

## 2018-09-11 PROCEDURE — 99441 C NONPHYSICIAN TELEPHONE ASSESSMENT 5-10 MIN: CPT | Performed by: PHYSICIAN ASSISTANT

## 2018-09-11 ASSESSMENT — ANXIETY QUESTIONNAIRES
5. BEING SO RESTLESS THAT IT IS HARD TO SIT STILL: NOT AT ALL
6. BECOMING EASILY ANNOYED OR IRRITABLE: NOT AT ALL
IF YOU CHECKED OFF ANY PROBLEMS ON THIS QUESTIONNAIRE, HOW DIFFICULT HAVE THESE PROBLEMS MADE IT FOR YOU TO DO YOUR WORK, TAKE CARE OF THINGS AT HOME, OR GET ALONG WITH OTHER PEOPLE: NOT DIFFICULT AT ALL
3. WORRYING TOO MUCH ABOUT DIFFERENT THINGS: NOT AT ALL
7. FEELING AFRAID AS IF SOMETHING AWFUL MIGHT HAPPEN: NOT AT ALL
2. NOT BEING ABLE TO STOP OR CONTROL WORRYING: NOT AT ALL
1. FEELING NERVOUS, ANXIOUS, OR ON EDGE: SEVERAL DAYS
GAD7 TOTAL SCORE: 1

## 2018-09-11 ASSESSMENT — PATIENT HEALTH QUESTIONNAIRE - PHQ9: 5. POOR APPETITE OR OVEREATING: NOT AT ALL

## 2018-09-11 NOTE — MR AVS SNAPSHOT
After Visit Summary   9/11/2018    Cristina Garcia    MRN: [id number]           Patient Information     Date Of Birth          1955        Visit Information        Provider Department      9/11/2018 1:45 PM Marlin Ann PA-C [facility]        Today's Diagnoses     Major depression in complete remission (H)        Anxiety           Follow-ups after your visit        Your next 10 appointments already scheduled     Sep 11, 2018  1:45 PM CDT   Telephone Visit with Marlin Ann PA-C   [facility]    [address] [facility] ([place] [address] [phone]           Note: this is not an onsite visit; there is no need to come to the facility.              Who to contact     If you have questions or need follow up information about today's clinic visit or your schedule please contact [facility] directly at [phone].  Normal or non-critical lab and imaging results will be communicated to you by MyChart, letter or phone within 4 business days after the clinic has received the results. If you do not hear from us within 7 days, please contact the clinic through [employer] or phone. If you have a critical or abnormal lab result, we will notify you by phone as soon as possible.  Submit refill requests through [employer] or call your pharmacy and they will forward the refill request to us. Please allow 3 business days for your refill to be completed.          Additional Information About Your Visit        [employer] Information     [employer] gives you secure access to your electronic health record. If you see a primary care provider, you can also send messages to your care team and make appointments. If you have questions, please call your primary care clinic.  If you do not have a primary care provider, please call [phone] and they will assist you.        Care EveryWhere ID     This is your Care EveryWhere ID. This could be used  by other organizations to access your [facility] medical records  [id number]         Blood Pressure from Last 3 Encounters:   07/25/18 110/62   09/20/17 110/70   03/07/17 112/62    Weight from Last 3 Encounters:   07/25/18 142 lb (64.4 kg)   09/20/17 141 lb 14.4 oz (64.4 kg)   03/07/17 146 lb (66.2 kg)              We Performed the Following     NONPHYSICIAN TELEPHONE ASSESSMENT 5-10 MIN          Where to get your medicines      These medications were sent to [facility] [address] [place] [address] [address] [facility] [address]  [address], [facility] [address]     Phone:  [phone]     sertraline 50 MG tablet          Primary Care Provider Office Phone # Fax #    Marlin Ann PA-C [phone] [address] [place] [address]        Equal Access to Services     [facility]: Hadii aad ku hadasho Soomaali, waaxda luqadaha, qaybta kaalmada adeegyada, waxay idiin hayaan vinheg catherine nunez . So [facility] [phone].    ATENCIÓN: Si habla español, tiene a frazier disposición servicios gratuitos de asistencia lingüística. [facility] [phone].    We comply with applicable federal civil rights laws and [place] laws. We do not discriminate on the basis of race, color, national origin, age, disability, sex, sexual orientation, or gender identity.            Thank you!     Thank you for choosing [facility]  for your care. Our goal is always to provide you with excellent care. Hearing back from our patients is one way we can continue to improve our services. Please take a few minutes to complete the written survey that you may receive in the mail after your visit with us. Thank you!             Your Updated Medication List - Protect others around you: Learn how to safely use, store and throw away your medicines at www.disposemymeds.org.          This list is accurate as of 9/11/18  9:38 AM.  Always use your most recent med list.                   Brand Name Dispense  Instructions for use Diagnosis    ASPIRIN ADULT LOW STRENGTH PO      Take 81 mg by mouth        BRAIN PO      Take by mouth daily        calcium carbonate 500 mg {elemental} 500 MG tablet    OS-DONNA     Take 1,000 mg by mouth daily        MULTIVITAMIN PO           sertraline 50 MG tablet    ZOLOFT    90 tablet    Take 1 tablet (50 mg) by mouth daily    Major depression in complete remission (H), Anxiety

## 2018-09-11 NOTE — PROGRESS NOTES
"Cristina Garcia is a 63 year old female who is being evaluated via a telephone visit.      The patient has been notified of following:     \"This telephone visit will be conducted via a call between you and your physician/provider. We have found that certain health care needs can be provided without the need for a physical exam.  This service lets us provide the care you need with a short phone conversation.  If a prescription is necessary we can send it directly to your pharmacy.  If lab work is needed we can place an order for that and you can then stop by our lab to have the test done at a later time.    We will bill your insurance company for this service.  Please check with your medical insurance if this type of visit is covered. You may be responsible for the cost of this type of visit if insurance coverage is denied.  The typical cost is $30 (10min), $59 (11-20min) and $85 (21-30min).  Most often these visits are shorter than 10 minutes.    If during the course of the call the physician/provider feels a telephone visit is not appropriate, you will not be charged for this service.\"       Consent has been obtained for this service by care team member: yes.   See the scanned image in the medical record.    Cristina Garcia complains of  No chief complaint on file.      I have reviewed and updated the patient's Past Medical History, Social History, Family History and Medication List.    ALLERGIES  Review of patient's allergies indicates no known allergies.    Lila King CMA ([facility])   (MA signature)    Additional provider notes: She is doing so much better, she likes the Zoloft at the current dosage.  She is having no side effects.  When she feels overwhelmed she is better able to handle the situation with breathing and relaxation techniques.  Her anxiety attacks are better overall.  She states she feels more like herself.  She did see neurology and they did evaluate her for her memory concerns, her MRI is normal.  " Extra vitamin D and her sertraline have been most helpful    Assessment/Plan:  (F32.5) Major depression in complete remission (H)  Comment: Resolved  Plan:     (F41.9) Anxiety  Comment: Improved  Plan: Continue current meds, recheck 6 months she may try to taper towards the end of the 6 month or if she prefers we can continue her current medications next office visit is in July otherwise phone visits are fine      I have reviewed the note as documented above.  This accurately captures the substance of my conversation with the patient,  Marlin Ann PA-C     Total time of call between patient and provider was 7 minutes

## 2018-09-12 ASSESSMENT — ANXIETY QUESTIONNAIRES: GAD7 TOTAL SCORE: 1

## 2018-09-12 ASSESSMENT — PATIENT HEALTH QUESTIONNAIRE - PHQ9: SUM OF ALL RESPONSES TO PHQ QUESTIONS 1-9: 1

## 2018-10-10 ENCOUNTER — TELEPHONE (OUTPATIENT)
Dept: FAMILY MEDICINE | Facility: OTHER | Age: 63
End: 2018-10-10

## 2018-10-10 NOTE — TELEPHONE ENCOUNTER
Summary:    Patient is due/failing the following:   FIT and LDL    Action needed:   Patient needs fasting lab only appointment and complete a FIT test     Type of outreach:    Phone, left message for patient to call back.     Questions for provider review:    None                                                                                                                                    Jovita Macias       Chart routed to Care Team .          Panel Management Review      Patient has the following on her problem list:     Depression / Dysthymia review    Measure:  Needs PHQ-9 score of 4 or less during index window.  Administer PHQ-9 and if score is 5 or more, send encounter to provider for next steps.        PHQ-9 SCORE 9/20/2017 7/25/2018 9/11/2018   Total Score - - -   Total Score MyChart 2 (Minimal depression) 3 (Minimal depression) -   Total Score 2 3 1       If PHQ-9 recheck is 5 or more, route to provider for next steps.    Patient is due for:  None      Composite cancer screening  Chart review shows that this patient is due/due soon for the following Fecal Colorectal (FIT)

## 2018-10-11 NOTE — TELEPHONE ENCOUNTER
Reason for Call:  Other call back    Detailed comments: Patient called clinic back; relayed message from below. [place] so  No follow ups here needed.     Phone Number Patient can be reached at: Home number on file [phone] (home)    Best Time: ---    Can we leave a detailed message on this number? Not Applicable    Call taken on 10/11/2018 at 10:44 AM by Juan Meek

## 2019-04-06 DIAGNOSIS — F41.9 ANXIETY: ICD-10-CM

## 2019-04-06 DIAGNOSIS — F32.5 MAJOR DEPRESSION IN COMPLETE REMISSION (H): ICD-10-CM

## 2019-04-09 NOTE — TELEPHONE ENCOUNTER
Chart states that the patient is no longer a [facility] patient.   If patient calls back please review the message below.   Arjun Zavala MA  April 9, 2019

## 2019-04-09 NOTE — TELEPHONE ENCOUNTER
Zoloft  Medication is being filled for 1 time refill only due to:  Patient needs to be seen because mood follow up.     Celena Plaza, RN, BSN

## 2020-03-10 ENCOUNTER — HEALTH MAINTENANCE LETTER (OUTPATIENT)
Age: 65
End: 2020-03-10

## 2020-12-20 ENCOUNTER — HEALTH MAINTENANCE LETTER (OUTPATIENT)
Age: 65
End: 2020-12-20

## 2021-04-24 ENCOUNTER — HEALTH MAINTENANCE LETTER (OUTPATIENT)
Age: 66
End: 2021-04-24

## 2021-10-03 ENCOUNTER — HEALTH MAINTENANCE LETTER (OUTPATIENT)
Age: 66
End: 2021-10-03

## 2022-05-15 ENCOUNTER — HEALTH MAINTENANCE LETTER (OUTPATIENT)
Age: 67
End: 2022-05-15

## 2022-09-11 ENCOUNTER — HEALTH MAINTENANCE LETTER (OUTPATIENT)
Age: 67
End: 2022-09-11

## 2023-01-22 ENCOUNTER — HEALTH MAINTENANCE LETTER (OUTPATIENT)
Age: 68
End: 2023-01-22

## 2023-06-03 ENCOUNTER — HEALTH MAINTENANCE LETTER (OUTPATIENT)
Age: 68
End: 2023-06-03